# Patient Record
Sex: MALE | Race: WHITE | Employment: UNEMPLOYED
[De-identification: names, ages, dates, MRNs, and addresses within clinical notes are randomized per-mention and may not be internally consistent; named-entity substitution may affect disease eponyms.]

---
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
## 2021-09-19 ENCOUNTER — HEALTH MAINTENANCE LETTER (OUTPATIENT)
Age: 21
End: 2021-09-19

## 2021-12-08 NOTE — LETTER
3/27/2018      RE: Peyman Reeves  [address]   [place] [facility] [address]       Carissa Alvarado MD  Mar 27, 2018      Outpatient Follow Up Consultation    Medical History: Peyman is a 17 year old male who returns to the Pediatric Gastroenterology clinic for ongoing management of EoE. Last seen by Dr. Shine Villegas, who is no longer with the practice, in August 2016 for EGD.     Peyman has a h/o mild intermittent asthma, eczema and environmental allergies. Per review of notes, RAST testing was positive for egg white. Per family, was also mildly positive for peanuts. Peyman has a h/o anaphylaxis to cat dander, for which he has an epipen. He avoids eggs (if in the first 5 ingredients). Peyman was diagnosed with EoE in 2011 at age 10. He has had multiple endoscopies, although the records do not indicate what medications or what diet Peyman was on at the time of each procedure. Peyman's mother reports that they initially tried swallowed steroids and no milk while they were following with Dr. Wise. Peyman has been on swallowed steroids and a general diet except for limited egg since starting with Dr. Villegas around 2013.     Date  Prox Esoph  Distal Esoph  Stomach Duodenum    3/15/11 EoE   EoE   Normal  Normal    8/21/12 5 eos/HPF  20 eos/HPF      8/30/13 No eos   20 eos/HPF  Reactive Normal    10/17/14    25 eos/HPF  Normal  Normal    8/25/15 5 eos/HPF  40 eos/HPF  40 eos/HPF Rare eos    8/15/16 20 eos/HPF  80 eos/HPF  Normal  50 eos/HPF      Peyman is not currently taking his medications. He reports that he isn't taking his meds because he doesn't know where they are. Remembering his medications is complicated by spending time at two households. Peyman and his mother would like to re-establish care for EoE.     Peyman reports some dysphagia occurring over the past few months, particularly with rice. His mother reports that he is a slow/careful eater. Occasional heartburn. No regurgitation. Has also been having  abdominal pain that seems random to him. No association with particular foods. New symptom over the past year has been alternating diarrhea and constipation. No rectal bleeding. Does not associate abdominal pain with stool changes. Can go up to 5 times daily when having loose stools. No nocturnal stools.       Past Medical History:   Diagnosis Date     Asthma in remission      Eczema      Eosinophilic esophagitis      Gastro-oesophageal reflux disease      Plantar warts      Shoulder impingement syndrome        Past Surgical History:   Procedure Laterality Date     ENDOSCOPY      x4     ESOPHAGOSCOPY, GASTROSCOPY, DUODENOSCOPY (EGD), COMBINED N/A 10/17/2014    Procedure: COMBINED ESOPHAGOSCOPY, GASTROSCOPY, DUODENOSCOPY (EGD), BIOPSY SINGLE OR MULTIPLE;  Surgeon: Dorothy Wise MD;  [facility]     ESOPHAGOSCOPY, GASTROSCOPY, DUODENOSCOPY (EGD), COMBINED N/A 8/5/2015    Procedure: COMBINED ESOPHAGOSCOPY, GASTROSCOPY, DUODENOSCOPY (EGD), BIOPSY SINGLE OR MULTIPLE;  Surgeon: Lenin Villegas MD;  [facility]     ESOPHAGOSCOPY, GASTROSCOPY, DUODENOSCOPY (EGD), COMBINED N/A 8/15/2016    Procedure: COMBINED ESOPHAGOSCOPY, GASTROSCOPY, DUODENOSCOPY (EGD), BIOPSY SINGLE OR MULTIPLE;  Surgeon: Lenin Villegas MD;  [facility]        Allergies   Allergen Reactions     Cats Anaphylaxis and Swelling     Amantadine      welts     Eggs      Molds & Smuts      Peanuts [Nuts]      Allergy tested, has not had a formal allergic reaction when ingested before        Current Outpatient Prescriptions   Medication Sig Dispense Refill     beclomethasone (QVAR) 80 MCG/ACT Inhaler Inhale 2 puffs into the lungs 2 times daily as needed 2 Inhaler 11     albuterol (PROAIR HFA/PROVENTIL HFA/VENTOLIN HFA) 108 (90 BASE) MCG/ACT Inhaler Inhale 2 puffs into the lungs every 6 hours (Patient taking differently: Inhale 2 puffs into the lungs every 6 hours as needed for wheezing ) 2 Inhaler 11     EPINEPHrine  "(EPIPEN 2-TAYLOR) 0.3 MG/0.3ML injection 2-pack Inject 0.3 mLs (0.3 mg) into the muscle as needed for anaphylaxis 0.6 mL 1     Cetirizine HCl (ZYRTEC ALLERGY PO) Take 10 mg by mouth daily as needed        fluticasone (FLOVENT HFA) 220 MCG/ACT inhaler One puff directly into the mouth without breathing in, then it should be dry swallowed. No food or drinks for 30 minutes afterwards (Patient not taking: Reported on 3/27/2018) 3 Inhaler 3     omeprazole (PRILOSEC) 20 MG capsule Take 1 capsule (20 mg) by mouth daily (Patient not taking: Reported on 3/27/2018) 90 capsule 4     [DISCONTINUED] albuterol (2.5 MG/3ML) 0.083% nebulizer solution 1 vial every 4 hours as needed          Family History   Problem Relation Age of Onset     Constipation No family hx of      Irritable Bowel Syndrome No family hx of      Inflammatory Bowel Disease No family hx of      Liver Disease No family hx of      Pancreatitis No family hx of    FHx positive for gallstones, reflux and insulin dependent diabetes.     Social History: Parents . Splits time 50/50 between households. 26yo sister no longer at home. Attends 11th grade. Plays baseball, which is starting soon. No alcohol, drug or tobacco use.     Review of Systems: Frequent headaches. Otherwise as above. All other systems negative per complete ROS per patient questionnaire.     Physical Exam: /58 (BP Location: Right arm, Patient Position: Sitting, Cuff Size: Adult Regular)  Pulse 68  Ht 5' 7.13\" (170.5 cm)  Wt 161 lb 9.6 oz (73.3 kg)  BMI 25.21 kg/m2  GEN: WDWN male in no acute distress. Pleasant. Answers questions appropriately. Cooperative with exam.   HEENT: NC/AT. Pupils equal and round. No scleral icterus. No rhinorrhea. MMMs.   LYMPH: No cervical or supraclavicular LAD bilaterally.  PULM: CTAB. Breath sounds symmetric. No wheezes or crackles.  CV: RRR. Normal S1, S2. No murmurs.  ABD: Nondistended. Normoactive bowel sounds. Soft, no tenderness to palpation. No HSM or " other masses.   EXT: No deformities, no clubbing. Cap refill <2sec. Radial pulse 2+.   SKIN: No jaundice, bruising or petechiae on incomplete skin exam.    Results Reviewed:   Previous EGDs reviewed per HPI      Assessment: Peyman is a 17 year old male with  1. Intermittent asthma - has not needed inhalers for some time  2. Eosinophilic esophagitis - not currently on treatment  3. Dysphagia - likely d/t active EoE  4. Intermittent abdominal pain with alternating constipation and diarrhea - differential includes EoE, eosinophilic gastroenteritis and irritable bowel syndrome    In addition to eosinophils in the esophagus, Peyman has also had high numbers of eosinophils in the stomach and the duodenum on a couple of endoscopies. This raises the possibility of eosinophilic gastroenteritis. Differential for eosinophilia would also include parasitic infection and Crohn's disease. Both seem unlikely given the duration of symptoms and intermittent constipation.     Peyman has never had an endoscopy without inflammation. Additionally, he feels like his symptoms have been progressing, so he almost certainly has active disease. Peyman is agreeable with restarting treatment. He prefers swallowed steroids over elimination diet. Discussed that if inflammation is present on his next endoscopy, my goal would be to adjust his treatment and repeat endoscopy a short time later to evaluate for resolution of inflammation.     Plan:  1. Start omeprazole 80mg by mouth daily. Best taken 30 minutes before a meal.   2. Script sent for budesonide 2mg by mouth daily (mix Pulmicort ampule in 10 packets of Splenda to create slurry). Brush teeth afterwards. Do not eat or drink for 30 minutes after taking. If insurance does not cover Pulmicort will go back to swallowed fluticasone 440mcg BID.   3. Repeat EGD in 2 months. If abdominal pain and diarrhea persists, will perform colonoscopy at the same time to evaluate for eosinophilic infiltration in the  lower GI tract.   4. Follow up to be determined based on endoscopy results.     Sincerely,     Carissa Alvarado MD  Pediatric Gastroenterology  [facility]    Saira Quiñones     Impaired gait/Weakness

## 2022-01-09 ENCOUNTER — HEALTH MAINTENANCE LETTER (OUTPATIENT)
Age: 22
End: 2022-01-09

## 2022-11-21 ENCOUNTER — HEALTH MAINTENANCE LETTER (OUTPATIENT)
Age: 22
End: 2022-11-21

## 2023-04-16 ENCOUNTER — HEALTH MAINTENANCE LETTER (OUTPATIENT)
Age: 23
End: 2023-04-16

## (undated) DEVICE — SOL WATER IRRIG 1000ML BOTTLE 2F7114

## (undated) DEVICE — ENDO BITE BLOCK PEDS BATRIK LATEX FREE B1

## (undated) DEVICE — SPECIMEN CONTAINER W/20ML 10% BUFF FORMALIN C4322-11

## (undated) DEVICE — ENDO TUBING W/CAP AUXILARY WATER INLET 100609 EGA-500

## (undated) DEVICE — KIT ENDO TURNOVER/PROCEDURE CARRY-ON 101822

## (undated) DEVICE — ENDO FORCEP ENDOJAW BIOPSY 2.8MMX230CM FB-220U

## (undated) DEVICE — KIT CONNECTOR FOR OLYMPUS ENDOSCOPES DEFENDO 100310

## (undated) DEVICE — TUBING SUCTION MEDI-VAC 1/4"X20' N620A

## (undated) RX ORDER — ONDANSETRON 2 MG/ML
INJECTION INTRAMUSCULAR; INTRAVENOUS
Status: DISPENSED
Start: 2019-08-12

## (undated) RX ORDER — PROPOFOL 10 MG/ML
INJECTION, EMULSION INTRAVENOUS
Status: DISPENSED
Start: 2019-08-12